# Patient Record
Sex: FEMALE | Race: WHITE | HISPANIC OR LATINO | URBAN - METROPOLITAN AREA
[De-identification: names, ages, dates, MRNs, and addresses within clinical notes are randomized per-mention and may not be internally consistent; named-entity substitution may affect disease eponyms.]

---

## 2023-06-06 ENCOUNTER — TELEPHONE (OUTPATIENT)
Dept: OTHER | Facility: OTHER | Age: 53
End: 2023-06-06

## 2023-06-06 ENCOUNTER — OFFICE VISIT (OUTPATIENT)
Dept: URGENT CARE | Age: 53
End: 2023-06-06

## 2023-06-06 ENCOUNTER — APPOINTMENT (OUTPATIENT)
Dept: RADIOLOGY | Age: 53
End: 2023-06-06

## 2023-06-06 VITALS
SYSTOLIC BLOOD PRESSURE: 132 MMHG | TEMPERATURE: 98.7 F | RESPIRATION RATE: 16 BRPM | HEART RATE: 67 BPM | OXYGEN SATURATION: 98 % | DIASTOLIC BLOOD PRESSURE: 70 MMHG

## 2023-06-06 DIAGNOSIS — M79.641 PAIN OF RIGHT HAND: ICD-10-CM

## 2023-06-06 DIAGNOSIS — M25.522 LEFT ELBOW PAIN: ICD-10-CM

## 2023-06-06 DIAGNOSIS — M79.642 PAIN OF LEFT HAND: ICD-10-CM

## 2023-06-06 DIAGNOSIS — M25.521 RIGHT ELBOW PAIN: ICD-10-CM

## 2023-06-06 DIAGNOSIS — M25.521 RIGHT ELBOW PAIN: Primary | ICD-10-CM

## 2023-06-06 PROCEDURE — 73080 X-RAY EXAM OF ELBOW: CPT

## 2023-06-06 PROCEDURE — 73130 X-RAY EXAM OF HAND: CPT

## 2023-06-06 PROCEDURE — G0382 LEV 3 HOSP TYPE B ED VISIT: HCPCS | Performed by: NURSE PRACTITIONER

## 2023-06-06 RX ORDER — AMLODIPINE BESYLATE AND ATORVASTATIN CALCIUM 10; 10 MG/1; MG/1
1 TABLET, FILM COATED ORAL DAILY
COMMUNITY

## 2023-06-06 RX ORDER — CANDESARTAN 32 MG/1
32 TABLET ORAL DAILY
COMMUNITY

## 2023-06-06 RX ORDER — PREDNISONE 20 MG/1
20 TABLET ORAL 2 TIMES DAILY WITH MEALS
Qty: 10 TABLET | Refills: 0 | Status: SHIPPED | OUTPATIENT
Start: 2023-06-06 | End: 2023-06-11

## 2023-06-06 NOTE — PROGRESS NOTES
[facility]        NAME: Steven Sierra is a 50 y o  female  : 1970    MRN: [id number]  DATE: 2023  TIME: 2:08 PM    Assessment and Plan   Right elbow pain [M25 521]  1  Right elbow pain  XR elbow 3+ vw right    predniSONE 20 mg tablet      2  Left elbow pain  XR elbow 3+ vw left      3  Pain of right hand  XR hand 3+ vw right      4  Pain of left hand  XR hand 3+ vw left            Patient Instructions     Patient is given tel # to establish care at [facility]  Take med as prescribed   Follow up with PCP in 3-5 days  Proceed to  ER if symptoms worsen  Chief Complaint     Chief Complaint   Patient presents with   • Headache     Headache and bilateral hand and arm pain x 1 month          History of Present Illness       HPI   Reports intermittent arm pain, chronic  In the last month has been mild, and severe in the past 10 days  She believes she was diagnosed with rheumatoid arthritis in , in [facility]  Says she got gabapentin, and pregabalin from [facility] and they are not helping  Also used NSAIDs and tylenol without relief  Moderate to severe pain  Worse with use of the bilateral arms  Review of Systems   Review of Systems   Musculoskeletal: Positive for arthralgias (hands and elbows) and joint swelling (hands)  Skin: Negative for color change, rash and wound  Neurological: Negative for numbness           Current Medications       Current Outpatient Medications:   •  amLODIPine-atorvastatin (CADUET) 10-10 MG per tablet, Take 1 tablet by mouth daily, Disp: , Rfl:   •  candesartan (ATACAND) 32 MG tablet, Take 32 mg by mouth daily, Disp: , Rfl:   •  predniSONE 20 mg tablet, Take 1 tablet (20 mg total) by mouth 2 (two) times a day with meals for 5 days, Disp: 10 tablet, Rfl: 0    Current Allergies     Allergies as of 2023   • (No Known Allergies)            The following portions of the patient's history were reviewed and updated as appropriate: allergies, current medications, past family history, past medical history, past social history, past surgical history and problem list      No past medical history on file  No past surgical history on file  No family history on file  Medications have been verified  Objective   /70 (BP Location: Left arm, Patient Position: Sitting, Cuff Size: Adult)   Pulse 67   Temp 98 7 °F (37 1 °C) (Tympanic)   Resp 16   SpO2 98%   No LMP recorded  Physical Exam     Physical Exam  Musculoskeletal:         General: Swelling (mild swelling of the finger joints, all fingers) and tenderness (with palpation of the fingers and the fleshy portion of the palm at the base of the thumb, and with palpation of the elbows, especially the extensor carpi longus and brevis muscles) present  No signs of injury  Comments: Reports pain with squeezing this provider's hands   Skin:     Capillary Refill: Capillary refill takes less than 2 seconds  Findings: No bruising or erythema

## 2023-06-21 ENCOUNTER — OFFICE VISIT (OUTPATIENT)
Dept: URGENT CARE | Age: 53
End: 2023-06-21

## 2023-06-21 VITALS
HEART RATE: 68 BPM | DIASTOLIC BLOOD PRESSURE: 77 MMHG | RESPIRATION RATE: 16 BRPM | TEMPERATURE: 98.2 F | OXYGEN SATURATION: 98 % | SYSTOLIC BLOOD PRESSURE: 137 MMHG

## 2023-06-21 DIAGNOSIS — R25.2 CRAMPING OF HANDS: Primary | ICD-10-CM

## 2023-06-21 PROCEDURE — G0382 LEV 3 HOSP TYPE B ED VISIT: HCPCS

## 2023-06-21 RX ORDER — METHOCARBAMOL 500 MG/1
500 TABLET, FILM COATED ORAL
Qty: 10 TABLET | Refills: 0 | Status: SHIPPED | OUTPATIENT
Start: 2023-06-21 | End: 2023-07-01

## 2023-06-21 NOTE — PROGRESS NOTES
[facility]        NAME: Serge Elise is a 50 y o  female  : 1970    MRN: [id number]  DATE: 2023  TIME: 9:51 AM    Assessment and Plan   Cramping of hands [R25 2]  1  Cramping of hands  methocarbamol (ROBAXIN) 500 mg tablet    Diclofenac Sodium (VOLTAREN) 1 %      Please wear wrist brace to left wrist during day, may remove at night  Please begin application of Voltaren gel every 12 hours as needed  Please begin muscle relaxer only as needed at night for cramping  Do not drive or operate heavy machinery within 8 hours of taking due to potential for sedation  Follow up with primary care provider on 2023 as planned  Patient Instructions   Arthritis   WHAT YOU NEED TO KNOW:   Arthritis is pain or disease in one or more joints  There are many types of arthritis  Types such as rheumatoid arthritis cause inflammation in the joints  Other types wear away the cartilage between joints, such as osteoarthritis  This makes the bones of the joint rub together when you move the joint  An infection from bacteria, a virus, or a fungus can also cause arthritis  Your symptoms may be constant, or symptoms may come and go  Arthritis often gets worse over time and can cause permanent joint damage  DISCHARGE INSTRUCTIONS:   Call your doctor or rheumatologist if:   • You have a fever and severe joint pain or swelling      • You cannot move the affected joint      • You have severe joint pain you cannot tolerate      • You have a new or worsening rash      • Your pain or swelling does not get better with treatment      • You have questions or concerns about your condition or care      Medicines:   • Acetaminophen  decreases pain and fever  It is available without a doctor's order  Ask how much to take and how often to take it  Follow directions   Read the labels of all other medicines you are using to see if they also contain acetaminophen, or ask your doctor or pharmacist  Acetaminophen can cause liver damage if not taken correctly      • NSAIDs , such as ibuprofen, help decrease swelling, pain, and fever  This medicine is available with or without a doctor's order  NSAIDs can cause stomach bleeding or kidney problems in certain people  If you take blood thinner medicine, always ask your healthcare provider if NSAIDs are safe for you  Always read the medicine label and follow directions      • Steroids  reduce swelling and pain      • Prescription pain medicine  may be given  Ask your healthcare provider how to take this medicine safely  Some prescription pain medicines contain acetaminophen  Do not take other medicines that contain acetaminophen without talking to your healthcare provider  Too much acetaminophen may cause liver damage  Prescription pain medicine may cause constipation  Ask your healthcare provider how to prevent or treat constipation       • Take your medicine as directed  Contact your healthcare provider if you think your medicine is not helping or if you have side effects  Tell your provider if you are allergic to any medicine  Keep a list of the medicines, vitamins, and herbs you take  Include the amounts, and when and why you take them  Bring the list or the pill bottles to follow-up visits  Carry your medicine list with you in case of an emergency      Manage your symptoms:   • Rest your painful joint so it can heal   Your healthcare provider may recommend crutches or a walker if the affected joint is in a leg      • Apply ice or heat to the joint  Both can help decrease swelling and pain  Ice may also help prevent tissue damage  Use an ice pack, or put crushed ice in a plastic bag  Cover it with a towel and place it on your joint for 15 to 20 minutes every hour or as directed  You can apply heat for 20 minutes every 2 hours  Heat treatment includes hot packs or heat lamps      • Elevate your joint  Elevation helps reduce swelling and pain   Raise your joint above the level of your heart as often as you can  Prop your painful joint on pillows to keep it above your heart comfortably         Manage arthritis:   • Talk to your healthcare providers about your arthritis medicines  Some medicines may only be needed when you have arthritis pain  You may need to take other medicines every day to prevent arthritis from getting worse  Your healthcare providers will help you understand all your medicines and when to take them  It is important to take the medicines as directed, even if you start to feel better  You can continue to have joint damage and inflammation even if you do not feel it      • Eat a variety of healthy foods  Healthy foods include fruits, vegetables, whole-grain breads, low-fat dairy products, beans, lean meats, and fish  Ask if you need to be on a special diet  A diet rich in calcium and vitamin D may decrease your risk of osteoporosis  Foods high in calcium include milk, cheese, broccoli, and tofu  Vitamin D may be found in meat, fish, fortified milk, cereal and bread  Ask if you need calcium or vitamin D supplements               • Go to physical or occupational therapy as directed  A physical therapist can teach you exercises to improve flexibility and range of motion  You may also be shown non-weight-bearing exercises that are safe for your joints, such as swimming  Exercise can help keep your joints flexible and reduce pain  An occupational therapist can help you learn to do your daily activities when your joints are stiff or sore      • Maintain a healthy weight  Extra weight puts increased pressure on your joints  Ask your healthcare provider what you should weigh  If you need to lose weight, he or she can help you create a weight loss program  Weight loss can help reduce pain and increase your ability to do your activities      • Wear flat or low-heeled shoes  This will help decrease pain and reduce pressure on your ankle, knee, and hip joints      • Do not smoke  Nicotine and other chemicals in cigarettes and cigars can damage your bones and joints  Ask your healthcare provider for information if you currently smoke and need help to quit  E-cigarettes or smokeless tobacco still contain nicotine  Talk to your healthcare provider before you use these products      Support devices:   • Orthotic shoes or insoles  help support your feet when you walk      • Crutches, a cane, or a walker  may help decrease your risk for falling  They also decrease stress on affected joints      • Devices to prevent falls  include raised toilet seats and bathtub bars to help you get up from sitting  Handrails can be placed in areas where you need balance and support           • Devices to help with support and rest  include splints to wear on your hands and a firm pillow while you sleep  Use a pillow that is firm enough to support your neck and head      Follow up with your healthcare provider or rheumatologist as directed:  Write down your questions so you remember to ask them during your visits  © Copyright [facility] 2022 Information is for End User's use only and may not be sold, redistributed or otherwise used for commercial purposes  The above information is an  only  It is not intended as medical advice for individual conditions or treatments  Talk to your doctor, nurse or pharmacist before following any medical regimen to see if it is safe and effective for you            Follow up with PCP in 3-5 days  Proceed to  ER if symptoms worsen  Chief Complaint     Chief Complaint   Patient presents with   • Hand Pain     Bilateral hand cramps that have been worsening and is waking her up at night when she sleeps          History of Present Illness       Patient is a 17-year-old female with 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, who presents for evaluation of left hand cramping over the past month   She reports that the pain occurs at night and wakes her from sleep  She also reports associated numbness/tingling of the 1st-3rd fingers which occurs at night as well  Of note, she was recently treated with a 5 day course of prednisone for pain and inflammation of the hands  She reports that this resolved the pain but not the cramps  She denies decreased strength, swelling, discoloration, direct trauma or injury  She has an appointment to establish care with PCP on 7/27/2023  Hand Pain   Associated symptoms include numbness  Pertinent negatives include no chest pain  Review of Systems   Review of Systems   Constitutional: Negative for fatigue and fever  HENT: Negative for congestion, ear discharge, ear pain, postnasal drip, rhinorrhea, sinus pressure, sinus pain, sneezing and sore throat  Eyes: Negative  Negative for pain, discharge, redness and itching  Respiratory: Negative  Negative for apnea, cough, choking, chest tightness, shortness of breath, wheezing and stridor  Cardiovascular: Negative  Negative for chest pain and palpitations  Gastrointestinal: Negative  Negative for diarrhea, nausea and vomiting  Endocrine: Negative  Negative for polydipsia, polyphagia and polyuria  Genitourinary: Negative  Negative for decreased urine volume and flank pain  Musculoskeletal: Positive for arthralgias  Negative for back pain, gait problem, joint swelling, myalgias, neck pain and neck stiffness  Skin: Negative  Negative for color change and rash  Allergic/Immunologic: Negative  Negative for environmental allergies  Neurological: Positive for numbness  Negative for dizziness, facial asymmetry, light-headedness and headaches  Hematological: Negative  Negative for adenopathy  Psychiatric/Behavioral: Negative            Current Medications       Current Outpatient Medications:   •  Diclofenac Sodium (VOLTAREN) 1 %, Apply 2 g topically 2 (two) times a day as needed (hand pain/cramping), Disp: 100 g, Rfl: 0  •  methocarbamol (ROBAXIN) 500 mg tablet, Take 1 tablet (500 mg total) by mouth daily at bedtime as needed for muscle spasms for up to 10 days, Disp: 10 tablet, Rfl: 0  •  amLODIPine-atorvastatin (CADUET) 10-10 MG per tablet, Take 1 tablet by mouth daily, Disp: , Rfl:   •  candesartan (ATACAND) 32 MG tablet, Take 32 mg by mouth daily, Disp: , Rfl:     Current Allergies     Allergies as of 06/21/2023   • (No Known Allergies)            The following portions of the patient's history were reviewed and updated as appropriate: allergies, current medications, past family history, past medical history, past social history, past surgical history and problem list      No past medical history on file  No past surgical history on file  No family history on file  Medications have been verified  Objective   /77 (BP Location: Left arm, Patient Position: Sitting, Cuff Size: Adult)   Pulse 68   Temp 98 2 °F (36 8 °C) (Tympanic)   Resp 16   SpO2 98%        Physical Exam     Physical Exam  Vitals and nursing note reviewed  Constitutional:       General: She is not in acute distress  Appearance: Normal appearance  She is not ill-appearing, toxic-appearing or diaphoretic  HENT:      Head: Normocephalic and atraumatic  Right Ear: External ear normal       Left Ear: External ear normal       Nose: Nose normal  No congestion or rhinorrhea  Mouth/Throat:      Mouth: Mucous membranes are moist    Eyes:      Extraocular Movements: Extraocular movements intact  Conjunctiva/sclera: Conjunctivae normal       Pupils: Pupils are equal, round, and reactive to light  Cardiovascular:      Rate and Rhythm: Normal rate and regular rhythm  Pulses: Normal pulses  Heart sounds: Normal heart sounds  No murmur heard  No friction rub  No gallop  Pulmonary:      Effort: Pulmonary effort is normal  No respiratory distress  Breath sounds: Normal breath sounds  No stridor   No wheezing, rhonchi or rales  Abdominal:      General: Bowel sounds are normal       Palpations: Abdomen is soft  Tenderness: There is no abdominal tenderness  There is no guarding or rebound  Musculoskeletal:         General: Normal range of motion  Right wrist: No swelling, deformity, effusion, lacerations, tenderness, bony tenderness, snuff box tenderness or crepitus  Normal range of motion  Normal pulse  Right hand: Normal       Cervical back: Normal range of motion and neck supple  No rigidity or tenderness  Comments: Right radial/ulnar pulses +2/+2, capillary refill < 2 seconds  Bilateral  strength strong  (-) Tinel's test, Finkelstein's test     Lymphadenopathy:      Cervical: No cervical adenopathy  Skin:     General: Skin is warm and dry  Capillary Refill: Capillary refill takes less than 2 seconds  Neurological:      General: No focal deficit present  Mental Status: She is alert and oriented to person, place, and time  Cranial Nerves: No cranial nerve deficit     Psychiatric:         Mood and Affect: Mood normal          Behavior: Behavior normal

## 2023-06-21 NOTE — PATIENT INSTRUCTIONS
Please wear wrist brace to left wrist during day, may remove at night  Please begin application of Voltaren gel every 12 hours as needed  Please begin muscle relaxer only as needed at night for cramping  Do not drive or operate heavy machinery within 8 hours of taking due to potential for sedation  Follow up with primary care provider on 7/27/2023 as planned

## 2023-08-22 ENCOUNTER — OFFICE VISIT (OUTPATIENT)
Dept: INTERNAL MEDICINE CLINIC | Facility: CLINIC | Age: 53
End: 2023-08-22

## 2023-08-22 VITALS
TEMPERATURE: 98.1 F | HEART RATE: 66 BPM | SYSTOLIC BLOOD PRESSURE: 133 MMHG | DIASTOLIC BLOOD PRESSURE: 81 MMHG | BODY MASS INDEX: 22.5 KG/M2 | HEIGHT: 63 IN | WEIGHT: 127 LBS

## 2023-08-22 DIAGNOSIS — M06.9 RHEUMATOID ARTHRITIS OF BOTH WRISTS, UNSPECIFIED WHETHER RHEUMATOID FACTOR PRESENT (HCC): ICD-10-CM

## 2023-08-22 DIAGNOSIS — Z76.89 ENCOUNTER TO ESTABLISH CARE: ICD-10-CM

## 2023-08-22 DIAGNOSIS — Z00.00 ANNUAL PHYSICAL EXAM: Primary | ICD-10-CM

## 2023-08-22 PROCEDURE — 99386 PREV VISIT NEW AGE 40-64: CPT | Performed by: INTERNAL MEDICINE

## 2023-08-22 RX ORDER — BISOPROLOL FUMARATE 5 MG/1
5 TABLET, FILM COATED ORAL DAILY
COMMUNITY

## 2023-08-22 RX ORDER — AMLODIPINE BESYLATE 10 MG/1
10 TABLET ORAL DAILY
COMMUNITY

## 2023-08-22 RX ORDER — PREDNISONE 10 MG/1
TABLET ORAL
Qty: 38 TABLET | Refills: 0 | Status: SHIPPED | OUTPATIENT
Start: 2023-08-22 | End: 2023-09-05

## 2023-08-22 RX ORDER — HYDROCHLOROTHIAZIDE 25 MG/1
25 TABLET ORAL DAILY
COMMUNITY

## 2023-08-22 NOTE — PROGRESS NOTES
[address] [facility] [place]    NAME: Ranjan Welsh  AGE: 50 y.o. SEX: female  : 1970     DATE: 2023     Assessment and Plan:     RA - patient reports a history of RA diagnosed at age 7 in the [facility]; however, this has been largely quiescent over the course of her life aside from occasional flares. On my discussion with her, she does report some symptoms consistent with RA (morning stiffness, reported warmth and swelling, etc); however, her exam today was relatively benign as was her recent imaging, especially in light of the intensity of her reported symptoms. Unfortunately, we do not have access to any of her prior records - as such, we would like to proceed with lab testing (RF, anti-CCP, ESR, CRP) to clarify her diagnosis. However, she was instructed to wait to see if she can get on her 's insurance prior to obtaining these so she does not have to pay out of pocket; she was also referred to our financial counselors for further discussions regarding this. In the meantime, we have ordered a prednisone taper (40 mg 4 day, 30 mg 4 days, 20 mg 4 days, and 10 mg 2 days) to try and alleviate her current symptoms. We will also refer her to our colleagues in Rheumatology. We will plan to see her in approximately one month to see how she is feeling; once she gets her lab work done (and if it confirms her diagnosis), she could likely be started on MTX/folate - I have ordered a baseline CMP/CBC for her to get as part of her lab work. HTN - patient relatively well-controlled on current regimen (bisoprolol, HCTZ, amlodipine, candesartan). We will continue to monitor this at future visits. General health maintenance - as the patient is new to us, she would be due for updated screening labs (HIV, HCV) and procedures (mammogram, cervical cancer screening, colonoscopy).  However, given her uninsured status, we will defer these for now. Once the patient obtains insurance/speaks with our financial counselors, we can revisit these in the future. In the meantime, I have also referred her to our associates in Gynecology and Dentistry so that she can establish with both of them as well. The patient understands and is amenable to this. Problem List Items Addressed This Visit        Musculoskeletal and Integument    Rheumatoid arthritis of both wrists (HCC)    Relevant Medications    predniSONE 10 mg tablet    Other Relevant Orders    Ambulatory Referral to Rheumatology    RF Screen w/ Reflex to Titer    Cyclic citrul peptide antibody, IgG    CBC and differential    Comprehensive metabolic panel    Sedimentation rate, automated    C-reactive protein   Other Visit Diagnoses     Annual physical exam    -  Primary    Encounter to establish care        Relevant Orders    Ambulatory referral to [address] [place] [address]    Ambulatory Referral to Gynecology    Ambulatory Referral to Financial Counseling Program          Immunizations and preventive care screenings were discussed with patient today. Appropriate education was printed on patient's after visit summary. Counseling:  Alcohol/drug use: discussed moderation in alcohol intake, the recommendations for healthy alcohol use, and avoidance of illicit drug use. Dental Health: discussed importance of regular tooth brushing, flossing, and dental visits. Injury prevention: discussed safety/seat belts, safety helmets, smoke detectors, carbon dioxide detectors, and smoking near bedding or upholstery. Sexual health: discussed sexually transmitted diseases, partner selection, use of condoms, avoidance of unintended pregnancy, and contraceptive alternatives. · Exercise: the importance of regular exercise/physical activity was discussed. Recommend exercise 3-5 times per week for at least 30 minutes.        Depression Screening and Follow-up Plan: Patient's depression screening was positive with a PHQ-2 score of 5. Their PHQ-9 score was 10. Patient assessed for underlying major depression. Brief counseling provided and recommend additional follow-up/re-evaluation next office visit. Depression likely due to other medical condition. Will treat underlying condition. Return in about 1 month (around 9/22/2023) for Follow-up of bilateral hand pain/RA. Chief Complaint:     Chief Complaint   Patient presents with   • Physical Exam     Bilateral hand and knuckles pain      History of Present Illness:     Adult Annual Physical   Patient here for a comprehensive physical exam. The patient reports problems - hand pain. Patient is a 50 y.o. F with PMH notable for RA (self-reported, diagnosed at age 7 per patient), and hypertension who presents to clinic today to establish care as well as for evaluation of bilateral hand pain. As mentioned above, she states that she was first diagnosed with RA at age 7 while back in the [facility]; however, she reports that it has been largely quiescent during the course of her life, even without medical therapy. She does endorse a flare-up that occurred in 2014 (possibly treated with prednisone); since that time, her symptoms have been well-controlled. She reports that her pain first began to worsen approximately 2-3 months ago. She cannot recall any particular inciting event for this most recent episode. Of note, she has been evaluated twice during the past several months for these symptoms, during which time she was given prednisone; this moderately improved her symptoms, though they ultimately returned. At this point, her main symptoms are pain, numbness, and cramping of her hands bilaterally.     On my discussion with the patient, she does endorse some symptoms consistent with RA; these include prolonged morning stiffness/gelling that improves with motion and heat, worsening of her symptoms during the winter, and warmth/swelling of her CMCs, MCPs and wrists. Interestingly, recent imaging done via urgent care of her bilateral hands and elbows did not reveal any significant joint changes or degenerative processes (aside from mild degenerative changes of the left elbow). The patient has been taking her 's gabapentin and using Voltaren gel to try and manage her symptoms with some mild improvement; pain is worsened by movement and usage of the affected joints. Of note, the patient does also endorse some other asymmetrical arthralgias (right ankle, left elbow primarily), as well as some occasional dry eye and dry mouth; she denies any symptoms consistent with uveitis or tenosynovitis. In regards to her other health issues, the patient reports that her hypertension has been well-controlled on her current regimen (bisoprolol, candesartan, amlodipine, HCTZ); BP in the office today was 133/81. Of note, the patient is currently uninsured but is planning to get  next week and will be attempting to get on her new 's plan. However, she is amenable to speaking with financial counselors in the interim. Diet and Physical Activity  · Diet/Nutrition: limited junk food and consuming 3-5 servings of fruits/vegetables daily. 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     Depression Screening  PHQ-2/9 Depression Screening    Little interest or pleasure in doing things: 3 - nearly every day  Feeling down, depressed, or hopeless: 2 - more than half the days  Trouble falling or staying asleep, or sleeping too much: 3 - nearly every day  Feeling tired or having little energy: 1 - several days  Poor appetite or overeatin - not at all  Feeling bad about yourself - or that you are a failure or have let yourself or your family down: 0 - not at all  Trouble concentrating on things, such as reading the newspaper or watching television: 1 - several days  Moving or speaking so slowly that other people could have noticed.  Or the opposite - being so fidgety or restless that you have been moving around a lot more than usual: 0 - not at all  Thoughts that you would be better off dead, or of hurting yourself in some way: 0 - not at all  PHQ-2 Score: 5  PHQ-2 Interpretation: POSITIVE depression screen  PHQ-9 Score: 10   PHQ-9 Interpretation: Moderate depression        General Health  · Sleep: sleeps poorly and gets 4-6 hours of sleep on average. · Hearing: normal - bilateral.  · Vision: no vision problems. · Dental: brushes teeth twice daily. /GYN Health  · Patient is: perimenopausal/postmenopausal  · Last menstrual period: 12/2022  · Contraceptive method: none. Review of Systems:     Review of Systems   Constitutional: Negative for chills, fever and unexpected weight change. HENT: Negative for rhinorrhea and sore throat. Occasional dry mouth   Eyes: Negative for photophobia, redness and visual disturbance. Occasional dry eye   Respiratory: Negative for cough and chest tightness. Cardiovascular: Negative for chest pain and palpitations. Gastrointestinal: Negative for abdominal pain, constipation and diarrhea. Genitourinary: Negative for difficulty urinating. Musculoskeletal: Positive for arthralgias, joint swelling, myalgias and neck pain. Skin: Negative for rash. Neurological: Negative for dizziness, light-headedness and headaches. Psychiatric/Behavioral: Negative for dysphoric mood. Past Medical History:     History reviewed. No pertinent past medical history. Past Surgical History:     History reviewed. No pertinent surgical history.    Social History:     Social History     Socioeconomic History   • Marital status: Single     Spouse name: None   • Number of children: None   • Years of education: None   • Highest education level: None   Occupational History   • None   Tobacco Use   • Smoking status: Former     Types: Cigarettes   • Smokeless tobacco: Former   Vaping Use   • Vaping Use: Never used   Substance and Sexual Activity   • Alcohol use: Never   • Drug use: Never   • Sexual activity: Not Currently   Other Topics Concern   • None   Social History Narrative   • None     Social Determinants of Health     Financial Resource Strain: Low Risk  (8/22/2023)    Overall Financial Resource Strain (CARDIA)    • Difficulty of Paying Living Expenses: Not hard at all   Food Insecurity: No Food Insecurity (8/22/2023)    Hunger Vital Sign    • Worried About Running Out of Food in the Last Year: Never true    • Ran Out of Food in the Last Year: Never true   Transportation Needs: No Transportation Needs (8/22/2023)    PRAPARE - Transportation    • Lack of Transportation (Medical): No    • Lack of Transportation (Non-Medical): No   Physical Activity: Not on file   Stress: Not on file   Social Connections: Not on file   Intimate Partner Violence: Not on file   Housing Stability: Not on file      Family History:     History reviewed. No pertinent family history. Current Medications:     Current Outpatient Medications   Medication Sig Dispense Refill   • amLODIPine (NORVASC) 10 mg tablet Take 10 mg by mouth daily     • bisoprolol (ZEBETA) 5 mg tablet Take 5 mg by mouth daily     • candesartan (ATACAND) 32 MG tablet Take 32 mg by mouth daily     • Diclofenac Sodium (VOLTAREN) 1 % Apply 2 g topically 2 (two) times a day as needed (hand pain/cramping) 100 g 0   • hydrochlorothiazide (HYDRODIURIL) 25 mg tablet Take 25 mg by mouth daily     • predniSONE 10 mg tablet Take 4 tablets (40 mg total) by mouth in the morning for 4 days, THEN 3 tablets (30 mg total) in the morning for 4 days, THEN 2 tablets (20 mg total) daily for 4 days, THEN 1 tablet (10 mg total) daily for 2 days. 38 tablet 0     No current facility-administered medications for this visit.       Allergies:     No Known Allergies   Physical Exam:     /81 (BP Location: Left arm, Patient Position: Sitting, Cuff Size: Large)   Pulse 66   Temp 98.1 °F (36.7 °C) (Temporal) Ht 5' 3" (1.6 m)   Wt 57.6 kg (127 lb)   BMI 22.50 kg/m²     Physical Exam  Vitals reviewed. Constitutional:       General: She is not in acute distress. Appearance: Normal appearance. HENT:      Head: Normocephalic and atraumatic. Right Ear: Tympanic membrane, ear canal and external ear normal.      Left Ear: Tympanic membrane, ear canal and external ear normal.      Nose: Nose normal.      Mouth/Throat:      Mouth: Mucous membranes are moist.      Pharynx: Oropharynx is clear. Eyes:      Conjunctiva/sclera: Conjunctivae normal.      Pupils: Pupils are equal, round, and reactive to light. Cardiovascular:      Rate and Rhythm: Normal rate and regular rhythm. Pulses: Normal pulses. Heart sounds: Normal heart sounds. Pulmonary:      Effort: Pulmonary effort is normal. No respiratory distress. Breath sounds: Normal breath sounds. Abdominal:      General: Abdomen is flat. Bowel sounds are normal. There is no distension. Tenderness: There is no abdominal tenderness. Musculoskeletal:         General: No swelling or deformity. Normal range of motion. Cervical back: Neck supple. Right lower leg: No edema. Left lower leg: No edema. Comments: No obvious joint deformities of the hands or wrists - no warmth, swelling, or bogginess of the problematic joints; some triggering of fingers of the left hand   Skin:     General: Skin is warm and dry. Capillary Refill: Capillary refill takes less than 2 seconds. Neurological:      Mental Status: She is alert and oriented to person, place, and time. Mental status is at baseline.       Comments: Strength, including  strength, intact throughout   Psychiatric:         Mood and Affect: Mood normal.         Behavior: Behavior normal.          Francisca Park MD  [address] [facility]

## 2023-08-22 NOTE — PATIENT INSTRUCTIONS
Wellness Visit for Adults   AMBULATORY CARE:   A wellness visit  is when you see your healthcare provider to get screened for health problems. Your healthcare provider will also give you advice on how to stay healthy. Write down your questions so you remember to ask them. Ask your healthcare provider how often you should have a wellness visit. What happens at a wellness visit:  Your healthcare provider will ask about your health, and your family history of health problems. This includes high blood pressure, heart disease, and cancer. He or she will ask if you have symptoms that concern you, if you smoke, and about your mood. You may also be asked about your intake of medicines, supplements, food, and alcohol. Any of the following may be done: Your weight  will be checked. Your height may also be checked so your body mass index (BMI) can be calculated. Your BMI shows if you are at a healthy weight. Your blood pressure  and heart rate will be checked. Your temperature may also be checked. Blood and urine tests  may be done. Blood tests may be done to check your cholesterol levels. Abnormal cholesterol levels increase your risk for heart disease and stroke. You may also need a blood or urine test to check for diabetes if you are at increased risk. Urine tests may be done to look for signs of an infection or kidney disease. A physical exam  includes checking your heartbeat and lungs with a stethoscope. Your healthcare provider may also check your skin to look for sun damage. Screening tests  may be recommended. A screening test is done to check for diseases that may not cause symptoms. The screening tests you may need depend on your age, gender, family history, and lifestyle habits. For example, colorectal screening may be recommended if you are 48years old or older. Screening tests you need if you are a woman:   A Pap smear  is used to screen for cervical cancer.  Pap smears are usually done every 3 to 5 years depending on your age. You may need them more often if you have had abnormal Pap smear test results in the past. Ask your healthcare provider how often you should have a Pap smear. A mammogram  is an x-ray of your breasts to screen for breast cancer. Experts recommend mammograms every 2 years starting at age 48 years. You may need a mammogram at age 52 years or younger if you have an increased risk for breast cancer. Talk to your healthcare provider about when you should start having mammograms and how often you need them. Vaccines you may need:   Get an influenza vaccine  every year. The influenza vaccine protects you from the flu. Several types of viruses cause the flu. The viruses change over time, so new vaccines are made each year. Get a tetanus-diphtheria (Td) booster vaccine  every 10 years. This vaccine protects you against tetanus and diphtheria. Tetanus is a severe infection that may cause painful muscle spasms and lockjaw. Diphtheria is a severe bacterial infection that causes a thick covering in the back of your mouth and throat. Get a human papillomavirus (HPV) vaccine  if you are female and aged 23 to 32 or male 23 to 24 and never received it. This vaccine protects you from HPV infection. HPV is the most common infection spread by sexual contact. HPV may also cause vaginal, penile, and anal cancers. Get a pneumococcal vaccine  if you are aged 72 years or older. The pneumococcal vaccine is an injection given to protect you from pneumococcal disease. Pneumococcal disease is an infection caused by pneumococcal bacteria. The infection may cause pneumonia, meningitis, or an ear infection. Get a shingles vaccine  if you are 60 or older, even if you have had shingles before. The shingles vaccine is an injection to protect you from the varicella-zoster virus. This is the same virus that causes chickenpox.  Shingles is a painful rash that develops in people who had chickenpox or have been exposed to the virus. How to eat healthy:  My Plate is a model for planning healthy meals. It shows the types and amounts of foods that should go on your plate. Fruits and vegetables make up about half of your plate, and grains and protein make up the other half. A serving of dairy is included on the side of your plate. The amount of calories and serving sizes you need depends on your age, gender, weight, and height. Examples of healthy foods are listed below:  Eat a variety of vegetables  such as dark green, red, and orange vegetables. You can also include canned vegetables low in sodium (salt) and frozen vegetables without added butter or sauces. Eat a variety of fresh fruits , canned fruit in 100% juice, frozen fruit, and dried fruit. Include whole grains. At least half of the grains you eat should be whole grains. Examples include whole-wheat bread, wheat pasta, brown rice, and whole-grain cereals such as oatmeal.    Eat a variety of protein foods such as seafood (fish and shellfish), lean meat, and poultry without skin (turkey and chicken). Examples of lean meats include pork leg, shoulder, or tenderloin, and beef round, sirloin, tenderloin, and extra lean ground beef. Other protein foods include eggs and egg substitutes, beans, peas, soy products, nuts, and seeds. Choose low-fat dairy products such as skim or 1% milk or low-fat yogurt, cheese, and cottage cheese. Limit unhealthy fats  such as butter, hard margarine, and shortening. Exercise:  Exercise at least 30 minutes per day on most days of the week. Some examples of exercise include walking, biking, dancing, and swimming. You can also fit in more physical activity by taking the stairs instead of the elevator or parking farther away from stores. Include muscle strengthening activities 2 days each week. Regular exercise provides many health benefits.  It helps you manage your weight, and decreases your risk for type 2 diabetes, heart disease, stroke, and high blood pressure. Exercise can also help improve your mood. Ask your healthcare provider about the best exercise plan for you. General health and safety guidelines:   Do not smoke. Nicotine and other chemicals in cigarettes and cigars can cause lung damage. Ask your healthcare provider for information if you currently smoke and need help to quit. E-cigarettes or smokeless tobacco still contain nicotine. Talk to your healthcare provider before you use these products. Limit alcohol. A drink of alcohol is 12 ounces of beer, 5 ounces of wine, or 1½ ounces of liquor. Lose weight, if needed. Being overweight increases your risk of certain health conditions. These include heart disease, high blood pressure, type 2 diabetes, and certain types of cancer. Protect your skin. Do not sunbathe or use tanning beds. Use sunscreen with a SPF 15 or higher. Apply sunscreen at least 15 minutes before you go outside. Reapply sunscreen every 2 hours. Wear protective clothing, hats, and sunglasses when you are outside. Drive safely. Always wear your seatbelt. Make sure everyone in your car wears a seatbelt. A seatbelt can save your life if you are in an accident. Do not use your cell phone when you are driving. This could distract you and cause an accident. Pull over if you need to make a call or send a text message. Practice safe sex. Use latex condoms if are sexually active and have more than one partner. Your healthcare provider may recommend screening tests for sexually transmitted infections (STIs). Wear helmets, lifejackets, and protective gear. Always wear a helmet when you ride a bike or motorcycle, go skiing, or play sports that could cause a head injury. Wear protective equipment when you play sports. Wear a lifejacket when you are on a boat or doing water sports.     © Copyright [employer] 2022 Information is for End User's use only and may not be sold, redistributed or otherwise used for commercial purposes. The above information is an  only. It is not intended as medical advice for individual conditions or treatments. Talk to your doctor, nurse or pharmacist before following any medical regimen to see if it is safe and effective for you.     We have placed orders for lab work for you to get done - please get this done once you get on your 's insurance    Please take prednisone (40 mg daily for 4 day, 30 mg daily for 4 days, 20 mg daily for 4 days, and 10 mg daily for 2 days); please take this in the morning with food    We can talk about other screening tests (breast cancer, cervical cancer, diabetes, etc) once you get on insurance and will not get stuck with a bill - in the meantime, please talk to our financial counselors     I have also referred you to Gynecology and Dentistry for you to establish care with them    We will see you back in one month to see how you are feeling; as long as your labs are done by then, we can talk about starting you on mediation to try and help your pain